# Patient Record
Sex: MALE | Race: BLACK OR AFRICAN AMERICAN | Employment: FULL TIME | URBAN - METROPOLITAN AREA
[De-identification: names, ages, dates, MRNs, and addresses within clinical notes are randomized per-mention and may not be internally consistent; named-entity substitution may affect disease eponyms.]

---
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
## 2024-03-04 ENCOUNTER — THERAPY VISIT (OUTPATIENT)
Dept: PHYSICAL THERAPY | Facility: CLINIC | Age: 24
End: 2024-03-04
Payer: MEDICAID

## 2024-03-04 DIAGNOSIS — M25.511 ACUTE PAIN OF RIGHT SHOULDER: Primary | ICD-10-CM

## 2024-03-04 PROCEDURE — 97161 PT EVAL LOW COMPLEX 20 MIN: CPT | Mod: GP | Performed by: PHYSICAL THERAPIST

## 2024-03-04 PROCEDURE — 97110 THERAPEUTIC EXERCISES: CPT | Mod: GP | Performed by: PHYSICAL THERAPIST

## 2024-03-04 NOTE — PROGRESS NOTES
"PHYSICAL THERAPY EVALUATION  Type of Visit: Evaluation    See electronic medical record for Abuse and Falls Screening details.    Subjective       Presenting condition or subjective complaint: Right shoulder pain over the last few months since doing a shoulder dip exercise at the gym. He reports doing \"a crazy amount of dips\" with pain afterwards that only worsened for a period of time. Since then, the pain has remained about the same. He has stopped doing dips since then.  Date of onset: 01/04/24    Relevant medical history:   asthma, overweight, smoking, substance use disorder  Dates & types of surgery:  NA    Prior diagnostic imaging/testing results:       Prior therapy history for the same diagnosis, illness or injury:        Prior Level of Function  Transfers:   Ambulation:   ADL:   IADL:     Living Environment  Social support:     Type of home:     Stairs to enter the home:         Ramp:     Stairs inside the home:         Help at home:    Equipment owned:       Employment:    [employer]  Hobbies/Interests:  working out, hiking, bike riding    Patient goals for therapy: Lift and workout without pain    Pain assessment: See objective evaluation for additional pain details     Objective   SHOULDER EVALUATION  PAIN: Pain Level at Rest: 0/10  Pain Level with Use: 9/10  Pain Location: anterior right shoulder  Pain Quality: sharp, shooting, excruciating  Pain Frequency: intermittent  Pain is Exacerbated By: shoulder dips, reaching overhead with weight  Pain is Relieved By: avoiding provoking motions  Pain Progression: Improved    INTEGUMENTARY (edema, incisions): WNL  POSTURE: forward head, rounded shoulders    BALANCE/PROPRIOCEPTION: NA  WEIGHTBEARING ALIGNMENT: NA  ROM:  All WNL and pain free except end range ER at 0 abduction and horizontal adduction at end range both slightly painful; ext 55 degrees with end range pain in anterior shoulder as well    STRENGTH:  All 5/5 except shoulder flexion 4+/5, shoulder ER 5-/5, " and mid trapezius 3+/5, lower trapezius 3+/5 (vs. 4+/5 for both on left)  FLEXIBILITY:   SPECIAL TESTS:   PALPATION:  anterior shoulder including pec minor and major hypertonic/tight  JOINT MOBILITY:   CERVICAL SCREEN:     Assessment & Plan   CLINICAL IMPRESSIONS  Medical Diagnosis: Strain of deltoid muscle, right, initial encounter    Treatment Diagnosis: Acute right shoulder pain   Impression/Assessment: Patient is a 23 year old male with right shoulder complaints.  The following significant findings have been identified: Pain, Decreased ROM/flexibility, Decreased joint mobility, Decreased strength, and Impaired muscle performance. These impairments interfere with their ability to perform work tasks and recreational activities as compared to previous level of function.     Clinical Decision Making (Complexity):  Clinical Presentation: Stable/Uncomplicated  Clinical Presentation Rationale: based on medical and personal factors listed in PT evaluation  Clinical Decision Making (Complexity): Low complexity    PLAN OF CARE  Treatment Interventions:  Modalities: Ultrasound  Interventions: Manual Therapy, Neuromuscular Re-education, Therapeutic Activity, Therapeutic Exercise, Self-Care/Home Management    Long Term Goals     PT Goal 1  Goal Identifier: LTG 1  Goal Description: Patient will be able to lift 25 pounds overhead or push >50 pounds without pain in his right shoulder  Rationale: to maximize safety and independence within the home;to maximize safety and independence with performance of ADLs and functional tasks (and sustain healthy lifestyle)  Target Date: 05/27/24      Frequency of Treatment: 1x/week  Duration of Treatment: 4 weeks then 2x/month for 2 months    Recommended Referrals to Other Professionals:  none  Education Assessment:   Learner/Method: No Barriers to Learning;Pictures/Video;Demonstration;Reading;Listening;Patient    Risks and benefits of evaluation/treatment have been explained.    Patient/Family/caregiver agrees with Plan of Care.     Evaluation Time:     PT Eval, Low Complexity Minutes (48681): 23     Signing Clinician: PATRICIA Batres [facility]                                                                                   OUTPATIENT PHYSICAL THERAPY    PLAN OF TREATMENT FOR OUTPATIENT REHABILITATION   Patient's Last Name, First Name, Mohamud Palma YOB: 2000   Provider's Name   [facility]   Medical Record No.  [id number]     Onset Date: 01/04/24  Start of Care Date: 03/04/24     Medical Diagnosis:  Strain of deltoid muscle, right, initial encounter      PT Treatment Diagnosis:  Acute right shoulder pain Plan of Treatment  Frequency/Duration: 1x/week/ 4 weeks then 2x/month for 2 months    Certification date from 03/04/24 to 05/27/24         See note for plan of treatment details and functional goals     Ata Leavitt, PT                         I CERTIFY THE NEED FOR THESE SERVICES FURNISHED UNDER        THIS PLAN OF TREATMENT AND WHILE UNDER MY CARE     (Physician attestation of this document indicates review and certification of the therapy plan).              Referring Provider:  Kelly Wilson    Initial Assessment  See Epic Evaluation- Start of Care Date: 03/04/24

## 2024-04-16 NOTE — PROGRESS NOTES
Physical Therapy Discharge Note     Mohamud Toscano was seen for 1 physical therapy visit and did not return.   Current status is unknown.   This episode for therapy has been closed as of April 16, 2024.